# Patient Record
Sex: FEMALE | Race: WHITE | NOT HISPANIC OR LATINO
[De-identification: names, ages, dates, MRNs, and addresses within clinical notes are randomized per-mention and may not be internally consistent; named-entity substitution may affect disease eponyms.]

---

## 2017-02-28 ENCOUNTER — FORM ENCOUNTER (OUTPATIENT)
Age: 57
End: 2017-02-28

## 2017-03-01 ENCOUNTER — OUTPATIENT (OUTPATIENT)
Dept: OUTPATIENT SERVICES | Facility: HOSPITAL | Age: 57
LOS: 1 days | End: 2017-03-01
Payer: COMMERCIAL

## 2017-03-01 ENCOUNTER — APPOINTMENT (OUTPATIENT)
Dept: ORTHOPEDIC SURGERY | Facility: CLINIC | Age: 57
End: 2017-03-01

## 2017-03-01 VITALS — HEIGHT: 63 IN | WEIGHT: 140 LBS | BODY MASS INDEX: 24.8 KG/M2

## 2017-03-01 PROCEDURE — 72020 X-RAY EXAM OF SPINE 1 VIEW: CPT

## 2017-03-01 PROCEDURE — 72020 X-RAY EXAM OF SPINE 1 VIEW: CPT | Mod: 26

## 2017-03-01 PROCEDURE — 73502 X-RAY EXAM HIP UNI 2-3 VIEWS: CPT | Mod: 26,RT

## 2017-03-01 PROCEDURE — 73502 X-RAY EXAM HIP UNI 2-3 VIEWS: CPT

## 2017-04-04 ENCOUNTER — FORM ENCOUNTER (OUTPATIENT)
Age: 57
End: 2017-04-04

## 2017-04-04 DIAGNOSIS — Z87.440 PERSONAL HISTORY OF URINARY (TRACT) INFECTIONS: ICD-10-CM

## 2017-04-04 DIAGNOSIS — Z87.19 PERSONAL HISTORY OF OTHER DISEASES OF THE DIGESTIVE SYSTEM: ICD-10-CM

## 2017-04-04 DIAGNOSIS — Z87.39 PERSONAL HISTORY OF OTHER DISEASES OF THE MUSCULOSKELETAL SYSTEM AND CONNECTIVE TISSUE: ICD-10-CM

## 2017-04-05 ENCOUNTER — OUTPATIENT (OUTPATIENT)
Dept: OUTPATIENT SERVICES | Facility: HOSPITAL | Age: 57
LOS: 1 days | End: 2017-04-05
Payer: COMMERCIAL

## 2017-04-05 DIAGNOSIS — Z22.321 CARRIER OR SUSPECTED CARRIER OF METHICILLIN SUSCEPTIBLE STAPHYLOCOCCUS AUREUS: ICD-10-CM

## 2017-04-05 DIAGNOSIS — M16.11 UNILATERAL PRIMARY OSTEOARTHRITIS, RIGHT HIP: ICD-10-CM

## 2017-04-05 LAB
ANION GAP SERPL CALC-SCNC: 8 MMOL/L — LOW (ref 9–16)
APPEARANCE UR: CLEAR — SIGNIFICANT CHANGE UP
APTT BLD: 32.2 SEC — SIGNIFICANT CHANGE UP (ref 27.5–37.4)
BACTERIA # UR AUTO: PRESENT /HPF
BILIRUB UR-MCNC: NEGATIVE — SIGNIFICANT CHANGE UP
BUN SERPL-MCNC: 13 MG/DL — SIGNIFICANT CHANGE UP (ref 7–23)
CALCIUM SERPL-MCNC: 8.6 MG/DL — SIGNIFICANT CHANGE UP (ref 8.5–10.5)
CHLORIDE SERPL-SCNC: 104 MMOL/L — SIGNIFICANT CHANGE UP (ref 96–108)
CO2 SERPL-SCNC: 27 MMOL/L — SIGNIFICANT CHANGE UP (ref 22–31)
COLOR SPEC: YELLOW — SIGNIFICANT CHANGE UP
CREAT SERPL-MCNC: 0.81 MG/DL — SIGNIFICANT CHANGE UP (ref 0.5–1.3)
DIFF PNL FLD: (no result)
EPI CELLS # UR: SIGNIFICANT CHANGE UP /HPF
GLUCOSE SERPL-MCNC: 95 MG/DL — SIGNIFICANT CHANGE UP (ref 70–99)
GLUCOSE UR QL: NEGATIVE — SIGNIFICANT CHANGE UP
HBA1C BLD-MCNC: 5.4 % — SIGNIFICANT CHANGE UP (ref 4.8–5.6)
HCT VFR BLD CALC: 36.2 % — SIGNIFICANT CHANGE UP (ref 34.5–45)
HGB BLD-MCNC: 12.2 G/DL — SIGNIFICANT CHANGE UP (ref 11.5–15.5)
INR BLD: 1.16 — SIGNIFICANT CHANGE UP (ref 0.88–1.16)
KETONES UR-MCNC: NEGATIVE — SIGNIFICANT CHANGE UP
LEUKOCYTE ESTERASE UR-ACNC: (no result)
MCHC RBC-ENTMCNC: 28.4 PG — SIGNIFICANT CHANGE UP (ref 27–34)
MCHC RBC-ENTMCNC: 33.7 G/DL — SIGNIFICANT CHANGE UP (ref 32–36)
MCV RBC AUTO: 84.4 FL — SIGNIFICANT CHANGE UP (ref 80–100)
NITRITE UR-MCNC: NEGATIVE — SIGNIFICANT CHANGE UP
PH UR: 5.5 — SIGNIFICANT CHANGE UP (ref 4–8)
PLATELET # BLD AUTO: 149 K/UL — LOW (ref 150–400)
POTASSIUM SERPL-MCNC: 4.2 MMOL/L — SIGNIFICANT CHANGE UP (ref 3.5–5.3)
POTASSIUM SERPL-SCNC: 4.2 MMOL/L — SIGNIFICANT CHANGE UP (ref 3.5–5.3)
PROT UR-MCNC: NEGATIVE MG/DL — SIGNIFICANT CHANGE UP
PROTHROM AB SERPL-ACNC: 12.9 SEC — HIGH (ref 9.8–12.7)
RBC # BLD: 4.29 M/UL — SIGNIFICANT CHANGE UP (ref 3.8–5.2)
RBC # FLD: 12.6 % — SIGNIFICANT CHANGE UP (ref 10.3–16.9)
RBC CASTS # UR COMP ASSIST: (no result) /HPF
SODIUM SERPL-SCNC: 139 MMOL/L — SIGNIFICANT CHANGE UP (ref 135–145)
SP GR SPEC: 1.01 — SIGNIFICANT CHANGE UP (ref 1–1.03)
UROBILINOGEN FLD QL: 0.2 E.U./DL — SIGNIFICANT CHANGE UP
WBC # BLD: 4.4 K/UL — SIGNIFICANT CHANGE UP (ref 3.8–10.5)
WBC # FLD AUTO: 4.4 K/UL — SIGNIFICANT CHANGE UP (ref 3.8–10.5)
WBC UR QL: < 5 /HPF — SIGNIFICANT CHANGE UP

## 2017-04-05 PROCEDURE — 71020: CPT | Mod: 26

## 2017-04-05 PROCEDURE — 87086 URINE CULTURE/COLONY COUNT: CPT

## 2017-04-05 PROCEDURE — 93010 ELECTROCARDIOGRAM REPORT: CPT | Mod: NC

## 2017-04-05 PROCEDURE — 72170 X-RAY EXAM OF PELVIS: CPT | Mod: 26

## 2017-04-05 PROCEDURE — 71046 X-RAY EXAM CHEST 2 VIEWS: CPT

## 2017-04-05 PROCEDURE — 87641 MR-STAPH DNA AMP PROBE: CPT

## 2017-04-05 PROCEDURE — 83036 HEMOGLOBIN GLYCOSYLATED A1C: CPT

## 2017-04-05 PROCEDURE — 81003 URINALYSIS AUTO W/O SCOPE: CPT

## 2017-04-05 PROCEDURE — 85610 PROTHROMBIN TIME: CPT

## 2017-04-05 PROCEDURE — 80048 BASIC METABOLIC PNL TOTAL CA: CPT

## 2017-04-05 PROCEDURE — 81001 URINALYSIS AUTO W/SCOPE: CPT

## 2017-04-05 PROCEDURE — 73564 X-RAY EXAM KNEE 4 OR MORE: CPT | Mod: 26,50

## 2017-04-05 PROCEDURE — 87184 SC STD DISK METHOD PER PLATE: CPT

## 2017-04-05 PROCEDURE — 72170 X-RAY EXAM OF PELVIS: CPT

## 2017-04-05 PROCEDURE — 93005 ELECTROCARDIOGRAM TRACING: CPT

## 2017-04-05 PROCEDURE — 85027 COMPLETE CBC AUTOMATED: CPT

## 2017-04-05 PROCEDURE — 73564 X-RAY EXAM KNEE 4 OR MORE: CPT

## 2017-04-05 PROCEDURE — 85730 THROMBOPLASTIN TIME PARTIAL: CPT

## 2017-04-07 LAB
-  AMPICILLIN: SIGNIFICANT CHANGE UP
-  CLINDAMYCIN: SIGNIFICANT CHANGE UP
-  ERYTHROMYCIN: SIGNIFICANT CHANGE UP
-  PENICILLIN: SIGNIFICANT CHANGE UP
-  VANCOMYCIN: SIGNIFICANT CHANGE UP
CULTURE RESULTS: SIGNIFICANT CHANGE UP
METHOD TYPE: SIGNIFICANT CHANGE UP
ORGANISM # SPEC MICROSCOPIC CNT: SIGNIFICANT CHANGE UP
ORGANISM # SPEC MICROSCOPIC CNT: SIGNIFICANT CHANGE UP
SPECIMEN SOURCE: SIGNIFICANT CHANGE UP

## 2017-04-09 LAB
MRSA PCR RESULT.: NEGATIVE — SIGNIFICANT CHANGE UP
S AUREUS DNA NOSE QL NAA+PROBE: NEGATIVE — SIGNIFICANT CHANGE UP

## 2017-04-18 PROBLEM — Z87.39 HISTORY OF OSTEOPOROSIS: Status: RESOLVED | Noted: 2017-03-01 | Resolved: 2017-04-18

## 2017-04-18 PROBLEM — Z87.19 HISTORY OF ESOPHAGEAL REFLUX: Status: RESOLVED | Noted: 2017-03-01 | Resolved: 2017-04-18

## 2017-04-18 PROBLEM — Z87.440 HISTORY OF URINARY TRACT INFECTION: Status: RESOLVED | Noted: 2017-03-01 | Resolved: 2017-04-18

## 2017-04-19 ENCOUNTER — MEDICATION RENEWAL (OUTPATIENT)
Age: 57
End: 2017-04-19

## 2017-04-24 ENCOUNTER — RESULT REVIEW (OUTPATIENT)
Age: 57
End: 2017-04-24

## 2017-04-25 ENCOUNTER — INPATIENT (INPATIENT)
Facility: HOSPITAL | Age: 57
LOS: 3 days | Discharge: HOME HEALTH | DRG: 470 | End: 2017-04-29
Attending: ORTHOPAEDIC SURGERY | Admitting: ORTHOPAEDIC SURGERY
Payer: COMMERCIAL

## 2017-04-25 ENCOUNTER — APPOINTMENT (OUTPATIENT)
Dept: ORTHOPEDIC SURGERY | Facility: HOSPITAL | Age: 57
End: 2017-04-25

## 2017-04-25 VITALS
SYSTOLIC BLOOD PRESSURE: 129 MMHG | HEIGHT: 62 IN | DIASTOLIC BLOOD PRESSURE: 70 MMHG | RESPIRATION RATE: 16 BRPM | HEART RATE: 73 BPM | OXYGEN SATURATION: 99 % | WEIGHT: 130.07 LBS | TEMPERATURE: 97 F

## 2017-04-25 DIAGNOSIS — M16.11 UNILATERAL PRIMARY OSTEOARTHRITIS, RIGHT HIP: ICD-10-CM

## 2017-04-25 LAB
APPEARANCE UR: CLEAR — SIGNIFICANT CHANGE UP
BACTERIA # UR AUTO: PRESENT /HPF
BILIRUB UR-MCNC: NEGATIVE — SIGNIFICANT CHANGE UP
COLOR SPEC: YELLOW — SIGNIFICANT CHANGE UP
DIFF PNL FLD: (no result)
EPI CELLS # UR: SIGNIFICANT CHANGE UP /HPF
GLUCOSE UR QL: NEGATIVE — SIGNIFICANT CHANGE UP
KETONES UR-MCNC: NEGATIVE — SIGNIFICANT CHANGE UP
LEUKOCYTE ESTERASE UR-ACNC: NEGATIVE — SIGNIFICANT CHANGE UP
NITRITE UR-MCNC: NEGATIVE — SIGNIFICANT CHANGE UP
PH UR: 6.5 — SIGNIFICANT CHANGE UP (ref 5–8)
PROT UR-MCNC: NEGATIVE MG/DL — SIGNIFICANT CHANGE UP
RBC CASTS # UR COMP ASSIST: > 10 /HPF
SP GR SPEC: 1.01 — SIGNIFICANT CHANGE UP (ref 1–1.03)
UROBILINOGEN FLD QL: 0.2 E.U./DL — SIGNIFICANT CHANGE UP
WBC UR QL: < 5 /HPF — SIGNIFICANT CHANGE UP

## 2017-04-25 PROCEDURE — 27130 TOTAL HIP ARTHROPLASTY: CPT | Mod: RT

## 2017-04-25 RX ORDER — SENNA PLUS 8.6 MG/1
2 TABLET ORAL AT BEDTIME
Qty: 0 | Refills: 0 | Status: DISCONTINUED | OUTPATIENT
Start: 2017-04-25 | End: 2017-04-29

## 2017-04-25 RX ORDER — CELECOXIB 200 MG/1
200 CAPSULE ORAL
Qty: 0 | Refills: 0 | Status: DISCONTINUED | OUTPATIENT
Start: 2017-04-25 | End: 2017-04-29

## 2017-04-25 RX ORDER — MORPHINE SULFATE 50 MG/1
2 CAPSULE, EXTENDED RELEASE ORAL
Qty: 0 | Refills: 0 | Status: DISCONTINUED | OUTPATIENT
Start: 2017-04-25 | End: 2017-04-25

## 2017-04-25 RX ORDER — ACETAMINOPHEN 500 MG
650 TABLET ORAL EVERY 6 HOURS
Qty: 0 | Refills: 0 | Status: DISCONTINUED | OUTPATIENT
Start: 2017-04-25 | End: 2017-04-29

## 2017-04-25 RX ORDER — MAGNESIUM HYDROXIDE 400 MG/1
30 TABLET, CHEWABLE ORAL DAILY
Qty: 0 | Refills: 0 | Status: DISCONTINUED | OUTPATIENT
Start: 2017-04-25 | End: 2017-04-29

## 2017-04-25 RX ORDER — ACETAMINOPHEN 500 MG
650 TABLET ORAL EVERY 8 HOURS
Qty: 0 | Refills: 0 | Status: DISCONTINUED | OUTPATIENT
Start: 2017-04-25 | End: 2017-04-29

## 2017-04-25 RX ORDER — POLYETHYLENE GLYCOL 3350 17 G/17G
17 POWDER, FOR SOLUTION ORAL DAILY
Qty: 0 | Refills: 0 | Status: DISCONTINUED | OUTPATIENT
Start: 2017-04-25 | End: 2017-04-29

## 2017-04-25 RX ORDER — MORPHINE SULFATE 50 MG/1
2 CAPSULE, EXTENDED RELEASE ORAL
Qty: 0 | Refills: 0 | Status: DISCONTINUED | OUTPATIENT
Start: 2017-04-25 | End: 2017-04-29

## 2017-04-25 RX ORDER — OXYCODONE HYDROCHLORIDE 5 MG/1
20 TABLET ORAL ONCE
Qty: 0 | Refills: 0 | Status: DISCONTINUED | OUTPATIENT
Start: 2017-04-25 | End: 2017-04-25

## 2017-04-25 RX ORDER — ONDANSETRON 8 MG/1
4 TABLET, FILM COATED ORAL EVERY 4 HOURS
Qty: 0 | Refills: 0 | Status: DISCONTINUED | OUTPATIENT
Start: 2017-04-25 | End: 2017-04-29

## 2017-04-25 RX ORDER — CELECOXIB 200 MG/1
200 CAPSULE ORAL ONCE
Qty: 0 | Refills: 0 | Status: DISCONTINUED | OUTPATIENT
Start: 2017-04-25 | End: 2017-04-25

## 2017-04-25 RX ORDER — KETOROLAC TROMETHAMINE 30 MG/ML
15 SYRINGE (ML) INJECTION EVERY 4 HOURS
Qty: 0 | Refills: 0 | Status: DISCONTINUED | OUTPATIENT
Start: 2017-04-25 | End: 2017-04-25

## 2017-04-25 RX ORDER — ASPIRIN/CALCIUM CARB/MAGNESIUM 324 MG
325 TABLET ORAL
Qty: 0 | Refills: 0 | Status: DISCONTINUED | OUTPATIENT
Start: 2017-04-25 | End: 2017-04-29

## 2017-04-25 RX ORDER — KETOROLAC TROMETHAMINE 0.5 %
1 DROPS OPHTHALMIC (EYE) ONCE
Qty: 0 | Refills: 0 | Status: COMPLETED | OUTPATIENT
Start: 2017-04-25 | End: 2017-04-25

## 2017-04-25 RX ORDER — BUPIVACAINE 13.3 MG/ML
20 INJECTION, SUSPENSION, LIPOSOMAL INFILTRATION ONCE
Qty: 0 | Refills: 0 | Status: DISCONTINUED | OUTPATIENT
Start: 2017-04-25 | End: 2017-04-25

## 2017-04-25 RX ORDER — DOCUSATE SODIUM 100 MG
100 CAPSULE ORAL THREE TIMES A DAY
Qty: 0 | Refills: 0 | Status: DISCONTINUED | OUTPATIENT
Start: 2017-04-25 | End: 2017-04-29

## 2017-04-25 RX ORDER — SODIUM CHLORIDE 9 MG/ML
1000 INJECTION, SOLUTION INTRAVENOUS
Qty: 0 | Refills: 0 | Status: DISCONTINUED | OUTPATIENT
Start: 2017-04-25 | End: 2017-04-29

## 2017-04-25 RX ORDER — CEFAZOLIN SODIUM 1 G
2000 VIAL (EA) INJECTION EVERY 8 HOURS
Qty: 0 | Refills: 0 | Status: COMPLETED | OUTPATIENT
Start: 2017-04-25 | End: 2017-04-26

## 2017-04-25 RX ORDER — ONDANSETRON 8 MG/1
4 TABLET, FILM COATED ORAL EVERY 6 HOURS
Qty: 0 | Refills: 0 | Status: DISCONTINUED | OUTPATIENT
Start: 2017-04-25 | End: 2017-04-29

## 2017-04-25 RX ORDER — OXYCODONE HYDROCHLORIDE 5 MG/1
10 TABLET ORAL EVERY 4 HOURS
Qty: 0 | Refills: 0 | Status: DISCONTINUED | OUTPATIENT
Start: 2017-04-25 | End: 2017-04-29

## 2017-04-25 RX ORDER — OXYCODONE HYDROCHLORIDE 5 MG/1
5 TABLET ORAL EVERY 4 HOURS
Qty: 0 | Refills: 0 | Status: DISCONTINUED | OUTPATIENT
Start: 2017-04-25 | End: 2017-04-29

## 2017-04-25 RX ADMIN — SODIUM CHLORIDE 100 MILLILITER(S): 9 INJECTION, SOLUTION INTRAVENOUS at 19:30

## 2017-04-25 RX ADMIN — Medication 650 MILLIGRAM(S): at 23:08

## 2017-04-25 RX ADMIN — Medication 100 MILLIGRAM(S): at 22:08

## 2017-04-25 RX ADMIN — Medication 15 MILLIGRAM(S): at 18:48

## 2017-04-25 RX ADMIN — MORPHINE SULFATE 2 MILLIGRAM(S): 50 CAPSULE, EXTENDED RELEASE ORAL at 18:48

## 2017-04-25 RX ADMIN — Medication 15 MILLIGRAM(S): at 19:00

## 2017-04-25 RX ADMIN — Medication 1 DROP(S): at 16:25

## 2017-04-25 RX ADMIN — Medication 650 MILLIGRAM(S): at 22:08

## 2017-04-25 RX ADMIN — MORPHINE SULFATE 2 MILLIGRAM(S): 50 CAPSULE, EXTENDED RELEASE ORAL at 19:00

## 2017-04-25 NOTE — H&P ADULT - PROBLEM SELECTOR PLAN 1
Admit to Orthopaedic Service.  Presents today for elective right total hip replacement  Pt medically stable and cleared for procedure today by Dr. Aleman

## 2017-04-25 NOTE — PROGRESS NOTE ADULT - SUBJECTIVE AND OBJECTIVE BOX
Ortho Post Op Check    Procedure: BIJAN  Surgeon: Hansel Stratton  Laterality: R    Pt comfortable without complaints, pain controlled  Denies CP, SOB, N/V, numbness/tingling     Vital Signs Last 24 Hrs  T(C): 36.2, Max: 36.2 (04-25 @ 20:10)  T(F): 97.1, Max: 97.1 (04-25 @ 20:10)  HR: 78 (74 - 89)  BP: 125/84 (120/77 - 146/81)  BP(mean): --  RR: 16 (16 - 16)  SpO2: 99% (99% - 100%)  Wt(kg): --  AVSS    General: Pt Alert and oriented, NAD  DSG C/D/I  Pulses:  Sensation: SILT   Motor: EHL/FHL/TA/GS  AIN/ PIN/ U / R / A            Post-op X-Ray: Implant in adequate position    A/P: 57yFemale POD#0 s/p   - Stable  - Pain Control  - DVT ppx:  mg BID  - Post op abx: Cefazolin  - PT, WBS: WBAT    Ortho Pager [phone]

## 2017-04-25 NOTE — H&P ADULT - NSHPLABSRESULTS_GEN_ALL_CORE
Preop CBC, BMP, PT/PTT/INR - WNL per medical clearance   UA/UCx+ treated with Augmentin - repeated DOS    Preop EKG - NSR - WNL per medical clearance   Preop CXR - no focal infiltrates - WNL per medical clearance

## 2017-04-25 NOTE — H&P ADULT - HISTORY OF PRESENT ILLNESS
57F with right hip pain x 4 years that began spontaneously and without injury or trauma. Pt's pain has progressively worsened without improvement and has become increasingly unresponsive to conservative management. Pt ambulates without the use of an assistive device at baseline. Denies numbness/tingling/weakness of extremities. Denies h/o DVT. Denies fever, chills, CP, SOB, N/V today.   +UA/UCx pre-op clearance exam, Augmentincourse completed per patient    Presents today for elective right total hip replacement

## 2017-04-26 LAB
ANION GAP SERPL CALC-SCNC: 7 MMOL/L — LOW (ref 9–16)
BUN SERPL-MCNC: 10 MG/DL — SIGNIFICANT CHANGE UP (ref 7–23)
CALCIUM SERPL-MCNC: 8.7 MG/DL — SIGNIFICANT CHANGE UP (ref 8.5–10.5)
CHLORIDE SERPL-SCNC: 104 MMOL/L — SIGNIFICANT CHANGE UP (ref 96–108)
CO2 SERPL-SCNC: 28 MMOL/L — SIGNIFICANT CHANGE UP (ref 22–31)
CREAT SERPL-MCNC: 0.58 MG/DL — SIGNIFICANT CHANGE UP (ref 0.5–1.3)
CULTURE RESULTS: SIGNIFICANT CHANGE UP
GLUCOSE SERPL-MCNC: 106 MG/DL — HIGH (ref 70–99)
HCT VFR BLD CALC: 31.6 % — LOW (ref 34.5–45)
HGB BLD-MCNC: 10.9 G/DL — LOW (ref 11.5–15.5)
MCHC RBC-ENTMCNC: 29.2 PG — SIGNIFICANT CHANGE UP (ref 27–34)
MCHC RBC-ENTMCNC: 34.5 G/DL — SIGNIFICANT CHANGE UP (ref 32–36)
MCV RBC AUTO: 84.7 FL — SIGNIFICANT CHANGE UP (ref 80–100)
PLATELET # BLD AUTO: 160 K/UL — SIGNIFICANT CHANGE UP (ref 150–400)
POTASSIUM SERPL-MCNC: 4.1 MMOL/L — SIGNIFICANT CHANGE UP (ref 3.5–5.3)
POTASSIUM SERPL-SCNC: 4.1 MMOL/L — SIGNIFICANT CHANGE UP (ref 3.5–5.3)
RBC # BLD: 3.73 M/UL — LOW (ref 3.8–5.2)
RBC # FLD: 12.2 % — SIGNIFICANT CHANGE UP (ref 10.3–16.9)
SODIUM SERPL-SCNC: 139 MMOL/L — SIGNIFICANT CHANGE UP (ref 135–145)
SPECIMEN SOURCE: SIGNIFICANT CHANGE UP
WBC # BLD: 11.7 K/UL — HIGH (ref 3.8–10.5)
WBC # FLD AUTO: 11.7 K/UL — HIGH (ref 3.8–10.5)

## 2017-04-26 PROCEDURE — 72170 X-RAY EXAM OF PELVIS: CPT | Mod: 26

## 2017-04-26 RX ORDER — DOCUSATE SODIUM 100 MG
1 CAPSULE ORAL
Qty: 0 | Refills: 0 | COMMUNITY
Start: 2017-04-26

## 2017-04-26 RX ORDER — ASPIRIN/CALCIUM CARB/MAGNESIUM 324 MG
1 TABLET ORAL
Qty: 0 | Refills: 0 | COMMUNITY
Start: 2017-04-26

## 2017-04-26 RX ORDER — LACTOBACILLUS ACIDOPHILUS 100MM CELL
0 CAPSULE ORAL
Qty: 0 | Refills: 0 | COMMUNITY
Start: 2017-04-26

## 2017-04-26 RX ORDER — MAGNESIUM HYDROXIDE 400 MG/1
30 TABLET, CHEWABLE ORAL
Qty: 0 | Refills: 0 | COMMUNITY
Start: 2017-04-26

## 2017-04-26 RX ORDER — LACTOBACILLUS ACIDOPHILUS 100MM CELL
1 CAPSULE ORAL DAILY
Qty: 0 | Refills: 0 | Status: DISCONTINUED | OUTPATIENT
Start: 2017-04-26 | End: 2017-04-29

## 2017-04-26 RX ADMIN — Medication 650 MILLIGRAM(S): at 13:50

## 2017-04-26 RX ADMIN — Medication 100 MILLIGRAM(S): at 06:04

## 2017-04-26 RX ADMIN — Medication 1 TABLET(S): at 06:52

## 2017-04-26 RX ADMIN — SENNA PLUS 2 TABLET(S): 8.6 TABLET ORAL at 21:20

## 2017-04-26 RX ADMIN — OXYCODONE HYDROCHLORIDE 5 MILLIGRAM(S): 5 TABLET ORAL at 16:13

## 2017-04-26 RX ADMIN — Medication 650 MILLIGRAM(S): at 07:00

## 2017-04-26 RX ADMIN — Medication 325 MILLIGRAM(S): at 06:04

## 2017-04-26 RX ADMIN — Medication 100 MILLIGRAM(S): at 13:50

## 2017-04-26 RX ADMIN — POLYETHYLENE GLYCOL 3350 17 GRAM(S): 17 POWDER, FOR SOLUTION ORAL at 13:50

## 2017-04-26 RX ADMIN — Medication 100 MILLIGRAM(S): at 21:20

## 2017-04-26 RX ADMIN — Medication 650 MILLIGRAM(S): at 14:50

## 2017-04-26 RX ADMIN — Medication 650 MILLIGRAM(S): at 22:21

## 2017-04-26 RX ADMIN — Medication 650 MILLIGRAM(S): at 21:21

## 2017-04-26 RX ADMIN — Medication 325 MILLIGRAM(S): at 16:13

## 2017-04-26 RX ADMIN — Medication 650 MILLIGRAM(S): at 06:03

## 2017-04-26 NOTE — DISCHARGE NOTE ADULT - HOME CARE AGENCY
[facility] [phone]  RN eval and home Physical therapy at her son's address  [address] [address] [place] [address]  [phone]  Son Garett Goss tel [phone]

## 2017-04-26 NOTE — DISCHARGE NOTE ADULT - CARE PROVIDERS DIRECT ADDRESSES
[email or web address]

## 2017-04-26 NOTE — PHYSICAL THERAPY INITIAL EVALUATION ADULT - CRITERIA FOR SKILLED THERAPEUTIC INTERVENTIONS
functional limitations in following categories/anticipated discharge recommendation/rehab potential/anticipated equipment needs at discharge/therapy frequency/impairments found/predicted duration of therapy intervention

## 2017-04-26 NOTE — PROGRESS NOTE ADULT - SUBJECTIVE AND OBJECTIVE BOX
SUBJECTIVE: Patient seen and examined. Pt doing well o/n.  No f/c/n/v/cp/sob.     OBJECTIVE:  Vital Signs Last 24 Hrs  T(C): 36.4, Max: 36.7 (- @ 15:45)  T(F): 97.5, Max: 98.1 (- @ 15:45)  HR: 70 (66 - 91)  BP: 119/76 (99/61 - 146/81)  BP(mean): --  RR: 16 (14 - 16)  SpO2: 96% (96% - 100%)    Affected extremity:          Dressing: clean/dry/intact            Sensation: SILT         Motor exam: 5/5 TA/GS/EHL         warm well perfused; capillary refill <3 seconds     LABS:            Urinalysis Basic - ( 2017 11:22 )    Color: Yellow / Appearance: Clear / S.015 / pH: x  Gluc: x / Ketone: NEGATIVE  / Bili: NEGATIVE / Urobili: 0.2 E.U./dL   Blood: x / Protein: NEGATIVE mg/dL / Nitrite: NEGATIVE   Leuk Esterase: NEGATIVE / RBC: > 10 /HPF / WBC < 5 /HPF   Sq Epi: x / Non Sq Epi: Few /HPF / Bacteria: Present /HPF      MEDICATIONS:lactated ringers. 1000milliLiter(s) IV Continuous <Continuous>  aspirin enteric coated 325milliGRAM(s) Oral two times a day  acetaminophen   Tablet 650milliGRAM(s) Oral every 6 hours PRN  acetaminophen   Tablet. 650milliGRAM(s) Oral every 8 hours  celecoxib 200milliGRAM(s) Oral two times a day after meals  oxyCODONE IR 5milliGRAM(s) Oral every 4 hours PRN  oxyCODONE IR 10milliGRAM(s) Oral every 4 hours PRN  morphine  - Injectable 2milliGRAM(s) IV Push every 3 hours PRN  ceFAZolin   IVPB 2000milliGRAM(s) IV Intermittent every 8 hours  aluminum hydroxide/magnesium hydroxide/simethicone Suspension 30milliLiter(s) Oral four times a day PRN  ondansetron Injectable 4milliGRAM(s) IV Push every 6 hours PRN  polyethylene glycol 3350 17Gram(s) Oral daily  senna 2Tablet(s) Oral at bedtime PRN  magnesium hydroxide Suspension 30milliLiter(s) Oral daily PRN  docusate sodium 100milliGRAM(s) Oral three times a day  ondansetron Injectable 4milliGRAM(s) IV Push every 4 hours PRN    Anticoagulation:  aspirin enteric coated 325milliGRAM(s) Oral two times a day    Antibiotics:   ceFAZolin   IVPB 2000milliGRAM(s) IV Intermittent every 8 hours    Pain medications:   acetaminophen   Tablet 650milliGRAM(s) Oral every 6 hours PRN  acetaminophen   Tablet. 650milliGRAM(s) Oral every 8 hours  celecoxib 200milliGRAM(s) Oral two times a day after meals  oxyCODONE IR 5milliGRAM(s) Oral every 4 hours PRN  oxyCODONE IR 10milliGRAM(s) Oral every 4 hours PRN  morphine  - Injectable 2milliGRAM(s) IV Push every 3 hours PRN  ondansetron Injectable 4milliGRAM(s) IV Push every 6 hours PRN  ondansetron Injectable 4milliGRAM(s) IV Push every 4 hours PRN    A/P :  Pt is a 56yo Female s/p R BIJAN Ant 17  POD # 1  -    Pain control  -    DVT ppx: ASA     -    Weight bearing status: WBAT   -    Physical Therapy  -    Dispo: Home

## 2017-04-26 NOTE — OCCUPATIONAL THERAPY INITIAL EVALUATION ADULT - LIVES WITH, PROFILE
spouse/in private home +steps, will be staying at son's home upon discharge-son will not be home during the day +steps

## 2017-04-26 NOTE — PHYSICAL THERAPY INITIAL EVALUATION ADULT - ADDITIONAL COMMENTS
Patient lives alone in a 2 story home, with stairs to enter the house. Will stay with son and daughter-in-law after discharge, however, similar home setup. Patient lives alone in a 2 story home, with stairs to enter the house. Will stay with son and daughter-in-law after discharge, however, similar home setup and will not have help at home during the day.

## 2017-04-26 NOTE — OCCUPATIONAL THERAPY INITIAL EVALUATION ADULT - MD ORDER
57F with right hip pain x 4 years that began spontaneously and without injury or trauma. Pt's pain has progressively worsened without improvement and has become increasingly unresponsive to conservative management. Pt ambulates without the use of an assistive device at baseline. Denies numbness/tingling/weakness of extremities. Denies h/o DVT. Denies fever, chills, CP, SOB, N/V today.

## 2017-04-26 NOTE — DISCHARGE NOTE ADULT - CARE PROVIDER_API CALL
Luis Stratton), Orthopaedic Surgery  [address] [place] [address]  Phone: [phone]  Fax: [phone]

## 2017-04-26 NOTE — DISCHARGE NOTE ADULT - HOSPITAL COURSE
Admit 4/25/17  Surgery S/P Right BIJAN (anterior)  Pre/post-op Antibiotics  DVT prophylaxis  Physical Therapy  Pain Management

## 2017-04-26 NOTE — PHYSICAL THERAPY INITIAL EVALUATION ADULT - PERTINENT HX OF CURRENT PROBLEM, REHAB EVAL
From H&P: "57F with right hip pain x 4 years that began spontaneously and without injury or trauma. Pt's pain has progressively worsened without improvement and has become increasingly unresponsive to conservative management. Pt ambulates without the use of an assistive device at baseline." S/p R BIJAN POD #1.

## 2017-04-26 NOTE — DISCHARGE NOTE ADULT - MEDICATION SUMMARY - MEDICATIONS TO TAKE
I will START or STAY ON the medications listed below when I get home from the hospital:    aspirin 325 mg oral delayed release tablet  -- 1 tab(s) by mouth 2 times a day. Please take for 4 weeks after surgery.  -- Indication: For Prevent blood clot    magnesium hydroxide 8% oral suspension  -- 30 milliliter(s) by mouth once a day, As needed, Constipation  -- Indication: For Prevent constipation    docusate sodium 100 mg oral capsule  -- 1 cap(s) by mouth 3 times a day  -- Indication: For stool softener    lactobacillus acidophilus oral capsule  --  by mouth   -- Indication: For GI PPX

## 2017-04-26 NOTE — PROGRESS NOTE ADULT - SUBJECTIVE AND OBJECTIVE BOX
ORTHO NOTE    [x ] Pt seen/examined. 8am  [ ] Pt without any complaints/in NAD.    [x ] Pt complains of: Incisional pain with movements.     [x ] ROS  otherwise negative    .    PHYSICAL EXAM:    Vital Signs Last 24 Hrs  T(C): 36.1, Max: 36.7 (04-25 @ 15:45)  T(F): 97, Max: 98.1 (04-25 @ 15:45)  HR: 78 (66 - 91)  BP: 130/62 (99/61 - 146/81)  BP(mean): --  RR: 16 (14 - 16)  SpO2: 97% (96% - 100%)    I&O's Detail  I & Os for 24h ending 26 Apr 2017 07:00  =============================================  IN:    lactated ringers.: 1100 ml    IV PiggyBack: 100 ml    Total IN: 1200 ml  ---------------------------------------------  OUT:    Intermittent Catheterization - Urethral: 800 ml    Voided: 800 ml    Total OUT: 1600 ml  ---------------------------------------------  Total NET: -400 ml    I & Os for current day (as of 26 Apr 2017 10:39)  =============================================  IN:    Oral Fluid: 420 ml    Total IN: 420 ml  ---------------------------------------------  OUT:    Voided: 800 ml    Total OUT: 800 ml  ---------------------------------------------  Total NET: -380 ml       CAPILLARY BLOOD GLUCOSE                  Neuro: A&0x3    Lungs: Denies SOB    CV: Denies chest pain    ABD: NON distended positive bowel sounds      Ext: NVID Dressing clean dry and intact.      LABS                        10.9   11.7  )-----------( 160      ( 26 Apr 2017 08:12 )             31.6                                04-26    139  |  104  |  10  ----------------------------<  106<H>  4.1   |  28  |  0.58    Ca    8.7      26 Apr 2017 08:12       ASSESSMENT/PLAN:      STATUS POST:  Right BIJAN (anterior)    CONTINUE:          [x ] PT WBAT    [x ] DVT PPX- ASA    [x ] Pain Mgt MEDICATIONS  (PRN):  oxyCODONE IR 5milliGRAM(s) Oral every 4 hours PRN Mild Pain  oxyCODONE IR 10milliGRAM(s) Oral every 4 hours PRN Moderate Pain  morphine  - Injectable 2milliGRAM(s) IV Push every 3 hours PRN Severe Pain      [x ] Dispo plan- Home

## 2017-04-26 NOTE — DISCHARGE NOTE ADULT - PATIENT PORTAL LINK FT
“You can access the [employer] Patient Portal, offered by [facility], by registering with the following website: [email or web address]”

## 2017-04-26 NOTE — DISCHARGE NOTE ADULT - ADDITIONAL INSTRUCTIONS
No strenuous activity, heavy lifting, driving, tub bathing, or returning to work until cleared by MD.  You may shower--dressing is waterproof.  Remove dressing after post op day 7, then leave incision open to air.  Follow up with Dr. Stratton call  to schedule an appt within 10-14 days.  If you don't have a bowel movement by post op day 3, then take Milk of Magnesia (over the counter).  If no bowel movement by at least post op day 5, then use a Dulcolax suppository (over the counter) and/or a Fleets enema--if still no bowel movement, call your MD.  Contact your doctor if you experience: fever greater than 101.5, chills, chest pain, difficulty breathing, bleeding, redness or heat around the incision.  Please follow up with your primary care provider.  Please take pain meds as prescribed by Dr Stratton.

## 2017-04-26 NOTE — OCCUPATIONAL THERAPY INITIAL EVALUATION ADULT - GENERAL OBSERVATIONS, REHAB EVAL
Right hand dominant. Chart reviewed, patient cleared for OT eval by JI Boss. Received in supine, NAD, +heplock, +SCDs.

## 2017-04-26 NOTE — PHYSICAL THERAPY INITIAL EVALUATION ADULT - ACTIVE RANGE OF MOTION EXAMINATION, REHAB EVAL
bilateral upper extremity Active ROM was WFL (within functional limits)/Left LE Active ROM was WFL (within functional limits)/R hip AROM limited by pain/deficits as listed below

## 2017-04-27 LAB
ANION GAP SERPL CALC-SCNC: 8 MMOL/L — LOW (ref 9–16)
BUN SERPL-MCNC: 12 MG/DL — SIGNIFICANT CHANGE UP (ref 7–23)
CALCIUM SERPL-MCNC: 8.1 MG/DL — LOW (ref 8.5–10.5)
CHLORIDE SERPL-SCNC: 104 MMOL/L — SIGNIFICANT CHANGE UP (ref 96–108)
CO2 SERPL-SCNC: 25 MMOL/L — SIGNIFICANT CHANGE UP (ref 22–31)
CREAT SERPL-MCNC: 0.61 MG/DL — SIGNIFICANT CHANGE UP (ref 0.5–1.3)
GLUCOSE SERPL-MCNC: 101 MG/DL — HIGH (ref 70–99)
HCT VFR BLD CALC: 27.9 % — LOW (ref 34.5–45)
HGB BLD-MCNC: 9.4 G/DL — LOW (ref 11.5–15.5)
MCHC RBC-ENTMCNC: 28.5 PG — SIGNIFICANT CHANGE UP (ref 27–34)
MCHC RBC-ENTMCNC: 33.7 G/DL — SIGNIFICANT CHANGE UP (ref 32–36)
MCV RBC AUTO: 84.5 FL — SIGNIFICANT CHANGE UP (ref 80–100)
PLATELET # BLD AUTO: 140 K/UL — LOW (ref 150–400)
POTASSIUM SERPL-MCNC: 3.6 MMOL/L — SIGNIFICANT CHANGE UP (ref 3.5–5.3)
POTASSIUM SERPL-SCNC: 3.6 MMOL/L — SIGNIFICANT CHANGE UP (ref 3.5–5.3)
RBC # BLD: 3.3 M/UL — LOW (ref 3.8–5.2)
RBC # FLD: 13.1 % — SIGNIFICANT CHANGE UP (ref 10.3–16.9)
SODIUM SERPL-SCNC: 137 MMOL/L — SIGNIFICANT CHANGE UP (ref 135–145)
SURGICAL PATHOLOGY STUDY: SIGNIFICANT CHANGE UP
WBC # BLD: 7.8 K/UL — SIGNIFICANT CHANGE UP (ref 3.8–10.5)
WBC # FLD AUTO: 7.8 K/UL — SIGNIFICANT CHANGE UP (ref 3.8–10.5)

## 2017-04-27 RX ADMIN — Medication 650 MILLIGRAM(S): at 06:16

## 2017-04-27 RX ADMIN — Medication 100 MILLIGRAM(S): at 13:31

## 2017-04-27 RX ADMIN — CELECOXIB 200 MILLIGRAM(S): 200 CAPSULE ORAL at 13:36

## 2017-04-27 RX ADMIN — Medication 650 MILLIGRAM(S): at 14:30

## 2017-04-27 RX ADMIN — OXYCODONE HYDROCHLORIDE 5 MILLIGRAM(S): 5 TABLET ORAL at 11:30

## 2017-04-27 RX ADMIN — Medication 325 MILLIGRAM(S): at 18:14

## 2017-04-27 RX ADMIN — Medication 1 TABLET(S): at 13:32

## 2017-04-27 RX ADMIN — Medication 325 MILLIGRAM(S): at 05:15

## 2017-04-27 RX ADMIN — OXYCODONE HYDROCHLORIDE 5 MILLIGRAM(S): 5 TABLET ORAL at 10:51

## 2017-04-27 RX ADMIN — OXYCODONE HYDROCHLORIDE 5 MILLIGRAM(S): 5 TABLET ORAL at 19:30

## 2017-04-27 RX ADMIN — OXYCODONE HYDROCHLORIDE 5 MILLIGRAM(S): 5 TABLET ORAL at 18:48

## 2017-04-27 RX ADMIN — POLYETHYLENE GLYCOL 3350 17 GRAM(S): 17 POWDER, FOR SOLUTION ORAL at 13:32

## 2017-04-27 RX ADMIN — Medication 10 MILLIGRAM(S): at 10:52

## 2017-04-27 RX ADMIN — Medication 650 MILLIGRAM(S): at 05:16

## 2017-04-27 RX ADMIN — Medication 100 MILLIGRAM(S): at 05:15

## 2017-04-27 RX ADMIN — Medication 650 MILLIGRAM(S): at 13:32

## 2017-04-27 RX ADMIN — CELECOXIB 200 MILLIGRAM(S): 200 CAPSULE ORAL at 14:04

## 2017-04-27 NOTE — PROGRESS NOTE ADULT - SUBJECTIVE AND OBJECTIVE BOX
SUBJECTIVE: Patient seen and examined. Pt doing well o/n.  No f/c/n/v/cp/sob.   Patient is unhappy about not being able to sleep and her room mate. I tried to explain the shortage of beds situation to the patient.    OBJECTIVE:  Vital Signs Last 24 Hrs  T(C): 36.7, Max: 37.3 ( @ 17:30)  T(F): 98, Max: 99.1 ( @ 17:30)  HR: 84 (78 - 91)  BP: 138/85 (102/58 - 138/85)  BP(mean): --  RR: 17 (15 - 17)  SpO2: 99% (96% - 99%)    Affected extremity:          Dressing: clean/dry/intact            Sensation: SILT         Motor exam: 5/5 TA/GS/EHL         warm well perfused; capillary refill <3 seconds     LABS:                        10.9   11.7  )-----------( 160      ( 2017 08:12 )             31.6         139  |  104  |  10  ----------------------------<  106<H>  4.1   |  28  |  0.58    Ca    8.7      2017 08:12        Urinalysis Basic - ( 2017 11:22 )    Color: Yellow / Appearance: Clear / S.015 / pH: x  Gluc: x / Ketone: NEGATIVE  / Bili: NEGATIVE / Urobili: 0.2 E.U./dL   Blood: x / Protein: NEGATIVE mg/dL / Nitrite: NEGATIVE   Leuk Esterase: NEGATIVE / RBC: > 10 /HPF / WBC < 5 /HPF   Sq Epi: x / Non Sq Epi: Few /HPF / Bacteria: Present /HPF      MEDICATIONS:lactated ringers. 1000milliLiter(s) IV Continuous <Continuous>  aspirin enteric coated 325milliGRAM(s) Oral two times a day  acetaminophen   Tablet 650milliGRAM(s) Oral every 6 hours PRN  acetaminophen   Tablet. 650milliGRAM(s) Oral every 8 hours  celecoxib 200milliGRAM(s) Oral two times a day after meals  oxyCODONE IR 5milliGRAM(s) Oral every 4 hours PRN  oxyCODONE IR 10milliGRAM(s) Oral every 4 hours PRN  morphine  - Injectable 2milliGRAM(s) IV Push every 3 hours PRN  aluminum hydroxide/magnesium hydroxide/simethicone Suspension 30milliLiter(s) Oral four times a day PRN  ondansetron Injectable 4milliGRAM(s) IV Push every 6 hours PRN  polyethylene glycol 3350 17Gram(s) Oral daily  bisacodyl Suppository 10milliGRAM(s) Rectal daily PRN  senna 2Tablet(s) Oral at bedtime PRN  magnesium hydroxide Suspension 30milliLiter(s) Oral daily PRN  docusate sodium 100milliGRAM(s) Oral three times a day  ondansetron Injectable 4milliGRAM(s) IV Push every 4 hours PRN  lactobacillus acidophilus 1Tablet(s) Oral daily    Anticoagulation:  aspirin enteric coated 325milliGRAM(s) Oral two times a day    Antibiotics:     Pain medications:   acetaminophen   Tablet 650milliGRAM(s) Oral every 6 hours PRN  acetaminophen   Tablet. 650milliGRAM(s) Oral every 8 hours  celecoxib 200milliGRAM(s) Oral two times a day after meals  oxyCODONE IR 5milliGRAM(s) Oral every 4 hours PRN  oxyCODONE IR 10milliGRAM(s) Oral every 4 hours PRN  morphine  - Injectable 2milliGRAM(s) IV Push every 3 hours PRN  ondansetron Injectable 4milliGRAM(s) IV Push every 6 hours PRN  ondansetron Injectable 4milliGRAM(s) IV Push every 4 hours PRN    A/P :  Pt is a 58yo Female s/p R BIJAN Ant Vega 17 POD # 2  -    Pain control  -    DVT ppx: ASA     -    Weight bearing status: WBAT   -    Physical Therapy  -    Dispo: Home

## 2017-04-27 NOTE — PROGRESS NOTE ADULT - SUBJECTIVE AND OBJECTIVE BOX
POST OPERATIVE DAY #: 2  STATUS POST: Right THR                        SUBJECTIVE: Patient seen and examined.     Pain:  well controlled     OBJECTIVE:     Vital Signs Last 24 Hrs  T(C): 36.6, Max: 37.3 ( @ 17:30)  T(F): 97.8, Max: 99.1 ( @ 17:30)  HR: 82 (82 - 91)  BP: 118/79 (102/58 - 138/85)  BP(mean): --  RR: 15 (15 - 17)  SpO2: 96% (96% - 99%)    Affected extremity:          Dressing: clean/dry/intact          Sensation: intact to light touch          Motor exam:  5 / 5 EHL         warm, well-perfused; capillary refill < 3 seconds              I&O's Detail  I & Os for 24h ending 2017 07:00  =============================================  IN:    Oral Fluid: 660 ml    Total IN: 660 ml  ---------------------------------------------  OUT:    Voided: 1400 ml    Total OUT: 1400 ml  ---------------------------------------------  Total NET: -740 ml    I & Os for current day (as of 2017 11:13)  =============================================  IN:    Oral Fluid: 360 ml    Total IN: 360 ml  ---------------------------------------------  OUT:    Voided: 200 ml    Total OUT: 200 ml  ---------------------------------------------  Total NET: 160 ml      LABS:                        9.4    7.8   )-----------( 140      ( 2017 07:30 )             27.9     04-    137  |  104  |  12  ----------------------------<  101<H>  3.6   |  25  |  0.61    Ca    8.1<L>      2017 07:30        Urinalysis Basic - ( 2017 11:22 )    Color: Yellow / Appearance: Clear / S.015 / pH: x  Gluc: x / Ketone: NEGATIVE  / Bili: NEGATIVE / Urobili: 0.2 E.U./dL   Blood: x / Protein: NEGATIVE mg/dL / Nitrite: NEGATIVE   Leuk Esterase: NEGATIVE / RBC: > 10 /HPF / WBC < 5 /HPF   Sq Epi: x / Non Sq Epi: Few /HPF / Bacteria: Present /HPF        MEDICATIONS:    acetaminophen   Tablet 650milliGRAM(s) Oral every 6 hours PRN  acetaminophen   Tablet. 650milliGRAM(s) Oral every 8 hours  celecoxib 200milliGRAM(s) Oral two times a day after meals  oxyCODONE IR 5milliGRAM(s) Oral every 4 hours PRN  oxyCODONE IR 10milliGRAM(s) Oral every 4 hours PRN  morphine  - Injectable 2milliGRAM(s) IV Push every 3 hours PRN  ondansetron Injectable 4milliGRAM(s) IV Push every 6 hours PRN  ondansetron Injectable 4milliGRAM(s) IV Push every 4 hours PRN    aspirin enteric coated 325milliGRAM(s) Oral two times a day      RADIOLOGY & ADDITIONAL STUDIES:    ASSESSMENT AND PLAN:     1. Pain control  2. DVT ppx: ASA BID  3. PT  4. Dispo: Home when cleared by PT

## 2017-04-28 LAB
ANION GAP SERPL CALC-SCNC: 8 MMOL/L — LOW (ref 9–16)
BUN SERPL-MCNC: 8 MG/DL — SIGNIFICANT CHANGE UP (ref 7–23)
CALCIUM SERPL-MCNC: 8.9 MG/DL — SIGNIFICANT CHANGE UP (ref 8.5–10.5)
CHLORIDE SERPL-SCNC: 105 MMOL/L — SIGNIFICANT CHANGE UP (ref 96–108)
CO2 SERPL-SCNC: 27 MMOL/L — SIGNIFICANT CHANGE UP (ref 22–31)
CREAT SERPL-MCNC: 0.6 MG/DL — SIGNIFICANT CHANGE UP (ref 0.5–1.3)
GLUCOSE SERPL-MCNC: 105 MG/DL — HIGH (ref 70–99)
HCT VFR BLD CALC: 26.9 % — LOW (ref 34.5–45)
HGB BLD-MCNC: 9.1 G/DL — LOW (ref 11.5–15.5)
MCHC RBC-ENTMCNC: 28.9 PG — SIGNIFICANT CHANGE UP (ref 27–34)
MCHC RBC-ENTMCNC: 33.8 G/DL — SIGNIFICANT CHANGE UP (ref 32–36)
MCV RBC AUTO: 85.4 FL — SIGNIFICANT CHANGE UP (ref 80–100)
PLATELET # BLD AUTO: 135 K/UL — LOW (ref 150–400)
POTASSIUM SERPL-MCNC: 4.2 MMOL/L — SIGNIFICANT CHANGE UP (ref 3.5–5.3)
POTASSIUM SERPL-SCNC: 4.2 MMOL/L — SIGNIFICANT CHANGE UP (ref 3.5–5.3)
RBC # BLD: 3.15 M/UL — LOW (ref 3.8–5.2)
RBC # FLD: 13.1 % — SIGNIFICANT CHANGE UP (ref 10.3–16.9)
SODIUM SERPL-SCNC: 140 MMOL/L — SIGNIFICANT CHANGE UP (ref 135–145)
WBC # BLD: 6.3 K/UL — SIGNIFICANT CHANGE UP (ref 3.8–10.5)
WBC # FLD AUTO: 6.3 K/UL — SIGNIFICANT CHANGE UP (ref 3.8–10.5)

## 2017-04-28 RX ADMIN — Medication 650 MILLIGRAM(S): at 22:07

## 2017-04-28 RX ADMIN — Medication 650 MILLIGRAM(S): at 14:00

## 2017-04-28 RX ADMIN — Medication 1 TABLET(S): at 13:06

## 2017-04-28 RX ADMIN — Medication 650 MILLIGRAM(S): at 13:07

## 2017-04-28 RX ADMIN — Medication 100 MILLIGRAM(S): at 21:37

## 2017-04-28 RX ADMIN — CELECOXIB 200 MILLIGRAM(S): 200 CAPSULE ORAL at 14:10

## 2017-04-28 RX ADMIN — Medication 325 MILLIGRAM(S): at 10:20

## 2017-04-28 RX ADMIN — CELECOXIB 200 MILLIGRAM(S): 200 CAPSULE ORAL at 22:07

## 2017-04-28 RX ADMIN — Medication 650 MILLIGRAM(S): at 06:14

## 2017-04-28 RX ADMIN — Medication 650 MILLIGRAM(S): at 21:37

## 2017-04-28 RX ADMIN — Medication 650 MILLIGRAM(S): at 07:00

## 2017-04-28 RX ADMIN — CELECOXIB 200 MILLIGRAM(S): 200 CAPSULE ORAL at 13:06

## 2017-04-28 RX ADMIN — CELECOXIB 200 MILLIGRAM(S): 200 CAPSULE ORAL at 21:36

## 2017-04-28 RX ADMIN — Medication 100 MILLIGRAM(S): at 13:06

## 2017-04-28 RX ADMIN — Medication 325 MILLIGRAM(S): at 21:37

## 2017-04-28 RX ADMIN — POLYETHYLENE GLYCOL 3350 17 GRAM(S): 17 POWDER, FOR SOLUTION ORAL at 13:06

## 2017-04-28 NOTE — PROGRESS NOTE ADULT - SUBJECTIVE AND OBJECTIVE BOX
SUBJECTIVE: Patient seen and examined. Pt doing well o/n.  No f/c/n/v/cp/sob.     OBJECTIVE:  Vital Signs Last 24 Hrs  T(C): 36.7, Max: 36.7 (04-28 @ 05:35)  T(F): 98, Max: 98 (04-28 @ 05:35)  HR: 86 (82 - 86)  BP: 123/79 (92/58 - 123/79)  BP(mean): --  RR: 14 (14 - 15)  SpO2: 96% (94% - 97%)    Affected extremity:          Dressing: clean/dry/intact            Sensation: SILT         Motor exam: 5/5 TA/GS/EHL         warm well perfused; capillary refill <3 seconds     LABS:                        9.4    7.8   )-----------( 140      ( 27 Apr 2017 07:30 )             27.9     04-27    137  |  104  |  12  ----------------------------<  101<H>  3.6   |  25  |  0.61    Ca    8.1<L>      27 Apr 2017 07:30          MEDICATIONS:lactated ringers. 1000milliLiter(s) IV Continuous <Continuous>  aspirin enteric coated 325milliGRAM(s) Oral two times a day  acetaminophen   Tablet 650milliGRAM(s) Oral every 6 hours PRN  acetaminophen   Tablet. 650milliGRAM(s) Oral every 8 hours  celecoxib 200milliGRAM(s) Oral two times a day after meals  oxyCODONE IR 5milliGRAM(s) Oral every 4 hours PRN  oxyCODONE IR 10milliGRAM(s) Oral every 4 hours PRN  morphine  - Injectable 2milliGRAM(s) IV Push every 3 hours PRN  aluminum hydroxide/magnesium hydroxide/simethicone Suspension 30milliLiter(s) Oral four times a day PRN  ondansetron Injectable 4milliGRAM(s) IV Push every 6 hours PRN  polyethylene glycol 3350 17Gram(s) Oral daily  bisacodyl Suppository 10milliGRAM(s) Rectal daily PRN  senna 2Tablet(s) Oral at bedtime PRN  magnesium hydroxide Suspension 30milliLiter(s) Oral daily PRN  docusate sodium 100milliGRAM(s) Oral three times a day  ondansetron Injectable 4milliGRAM(s) IV Push every 4 hours PRN  lactobacillus acidophilus 1Tablet(s) Oral daily    Anticoagulation:  aspirin enteric coated 325milliGRAM(s) Oral two times a day    Antibiotics:     Pain medications:   acetaminophen   Tablet 650milliGRAM(s) Oral every 6 hours PRN  acetaminophen   Tablet. 650milliGRAM(s) Oral every 8 hours  celecoxib 200milliGRAM(s) Oral two times a day after meals  oxyCODONE IR 5milliGRAM(s) Oral every 4 hours PRN  oxyCODONE IR 10milliGRAM(s) Oral every 4 hours PRN  morphine  - Injectable 2milliGRAM(s) IV Push every 3 hours PRN  ondansetron Injectable 4milliGRAM(s) IV Push every 6 hours PRN  ondansetron Injectable 4milliGRAM(s) IV Push every 4 hours PRN    A/P :  Pt is a 58yo Female s/p R BIJAN Ant Vega 4/25/17  POD # 3  -    Pain control  -    DVT ppx: ASA     -    Weight bearing status: WBAT   -    Physical Therapy  -    Dispo: Home

## 2017-04-29 VITALS
SYSTOLIC BLOOD PRESSURE: 99 MMHG | RESPIRATION RATE: 18 BRPM | DIASTOLIC BLOOD PRESSURE: 68 MMHG | OXYGEN SATURATION: 99 % | TEMPERATURE: 98 F | HEART RATE: 81 BPM

## 2017-04-29 LAB
ANION GAP SERPL CALC-SCNC: 9 MMOL/L — SIGNIFICANT CHANGE UP (ref 9–16)
BUN SERPL-MCNC: 12 MG/DL — SIGNIFICANT CHANGE UP (ref 7–23)
CALCIUM SERPL-MCNC: 8 MG/DL — LOW (ref 8.5–10.5)
CHLORIDE SERPL-SCNC: 106 MMOL/L — SIGNIFICANT CHANGE UP (ref 96–108)
CO2 SERPL-SCNC: 25 MMOL/L — SIGNIFICANT CHANGE UP (ref 22–31)
CREAT SERPL-MCNC: 0.64 MG/DL — SIGNIFICANT CHANGE UP (ref 0.5–1.3)
GLUCOSE SERPL-MCNC: 102 MG/DL — HIGH (ref 70–99)
HCT VFR BLD CALC: 27 % — LOW (ref 34.5–45)
HGB BLD-MCNC: 9.2 G/DL — LOW (ref 11.5–15.5)
MCHC RBC-ENTMCNC: 29.2 PG — SIGNIFICANT CHANGE UP (ref 27–34)
MCHC RBC-ENTMCNC: 34.1 G/DL — SIGNIFICANT CHANGE UP (ref 32–36)
MCV RBC AUTO: 85.7 FL — SIGNIFICANT CHANGE UP (ref 80–100)
PLATELET # BLD AUTO: 146 K/UL — LOW (ref 150–400)
POTASSIUM SERPL-MCNC: 3.9 MMOL/L — SIGNIFICANT CHANGE UP (ref 3.5–5.3)
POTASSIUM SERPL-SCNC: 3.9 MMOL/L — SIGNIFICANT CHANGE UP (ref 3.5–5.3)
RBC # BLD: 3.15 M/UL — LOW (ref 3.8–5.2)
RBC # FLD: 13 % — SIGNIFICANT CHANGE UP (ref 10.3–16.9)
SODIUM SERPL-SCNC: 140 MMOL/L — SIGNIFICANT CHANGE UP (ref 135–145)
WBC # BLD: 5.6 K/UL — SIGNIFICANT CHANGE UP (ref 3.8–10.5)
WBC # FLD AUTO: 5.6 K/UL — SIGNIFICANT CHANGE UP (ref 3.8–10.5)

## 2017-04-29 PROCEDURE — 86850 RBC ANTIBODY SCREEN: CPT

## 2017-04-29 PROCEDURE — C1776: CPT

## 2017-04-29 PROCEDURE — 97161 PT EVAL LOW COMPLEX 20 MIN: CPT

## 2017-04-29 PROCEDURE — 97535 SELF CARE MNGMENT TRAINING: CPT

## 2017-04-29 PROCEDURE — 87086 URINE CULTURE/COLONY COUNT: CPT

## 2017-04-29 PROCEDURE — 86900 BLOOD TYPING SEROLOGIC ABO: CPT

## 2017-04-29 PROCEDURE — 36415 COLL VENOUS BLD VENIPUNCTURE: CPT

## 2017-04-29 PROCEDURE — 81001 URINALYSIS AUTO W/SCOPE: CPT

## 2017-04-29 PROCEDURE — 76000 FLUOROSCOPY <1 HR PHYS/QHP: CPT

## 2017-04-29 PROCEDURE — 86901 BLOOD TYPING SEROLOGIC RH(D): CPT

## 2017-04-29 PROCEDURE — 72170 X-RAY EXAM OF PELVIS: CPT

## 2017-04-29 PROCEDURE — 80048 BASIC METABOLIC PNL TOTAL CA: CPT

## 2017-04-29 PROCEDURE — 85027 COMPLETE CBC AUTOMATED: CPT

## 2017-04-29 PROCEDURE — 97116 GAIT TRAINING THERAPY: CPT

## 2017-04-29 PROCEDURE — 88300 SURGICAL PATH GROSS: CPT

## 2017-04-29 RX ORDER — POTASSIUM CHLORIDE 20 MEQ
20 PACKET (EA) ORAL ONCE
Qty: 0 | Refills: 0 | Status: COMPLETED | OUTPATIENT
Start: 2017-04-29 | End: 2017-04-29

## 2017-04-29 RX ADMIN — Medication 650 MILLIGRAM(S): at 14:15

## 2017-04-29 RX ADMIN — Medication 325 MILLIGRAM(S): at 17:38

## 2017-04-29 RX ADMIN — Medication 650 MILLIGRAM(S): at 05:23

## 2017-04-29 RX ADMIN — CELECOXIB 200 MILLIGRAM(S): 200 CAPSULE ORAL at 09:53

## 2017-04-29 RX ADMIN — CELECOXIB 200 MILLIGRAM(S): 200 CAPSULE ORAL at 10:45

## 2017-04-29 RX ADMIN — Medication 1 TABLET(S): at 13:29

## 2017-04-29 RX ADMIN — Medication 100 MILLIGRAM(S): at 05:21

## 2017-04-29 RX ADMIN — Medication 325 MILLIGRAM(S): at 05:23

## 2017-04-29 RX ADMIN — Medication 20 MILLIEQUIVALENT(S): at 17:39

## 2017-04-29 RX ADMIN — Medication 650 MILLIGRAM(S): at 05:53

## 2017-04-29 RX ADMIN — Medication 650 MILLIGRAM(S): at 13:31

## 2017-04-29 NOTE — PROGRESS NOTE ADULT - SUBJECTIVE AND OBJECTIVE BOX
SUBJECTIVE: Patient seen and examined. Pt doing well o/n.  No f/c/n/v/cp/sob.       OBJECTIVE:  Vital Signs Last 24 Hrs  Vital Signs Last 24 Hrs  T(C): 36.3, Max: 36.7 ( @ 15:45)  T(F): 97.4, Max: 98.1 ( @ 15:45)  HR: 77 (73 - 88)  BP: 118/71 (92/62 - 118/71)  BP(mean): --  RR: 16 (14 - 16)  SpO2: 97% (95% - 99%)    Affected extremity:          Dressing: clean/dry/intact            Sensation: SILT         Motor exam: 5/5 TA/GS/EHL         warm well perfused; capillary refill <3 seconds     LABS:                           9.2    5.6   )-----------( 146      ( 2017 06:18 )             27.0         140  |  106  |  12  ----------------------------<  102<H>  3.9   |  25  |  0.64    Ca    8.0<L>      2017 06:18            Urinalysis Basic - ( 2017 11:22 )    Color: Yellow / Appearance: Clear / S.015 / pH: x  Gluc: x / Ketone: NEGATIVE  / Bili: NEGATIVE / Urobili: 0.2 E.U./dL   Blood: x / Protein: NEGATIVE mg/dL / Nitrite: NEGATIVE   Leuk Esterase: NEGATIVE / RBC: > 10 /HPF / WBC < 5 /HPF   Sq Epi: x / Non Sq Epi: Few /HPF / Bacteria: Present /HPF      MEDICATIONS:lactated ringers. 1000milliLiter(s) IV Continuous <Continuous>  aspirin enteric coated 325milliGRAM(s) Oral two times a day  acetaminophen   Tablet 650milliGRAM(s) Oral every 6 hours PRN  acetaminophen   Tablet. 650milliGRAM(s) Oral every 8 hours  celecoxib 200milliGRAM(s) Oral two times a day after meals  oxyCODONE IR 5milliGRAM(s) Oral every 4 hours PRN  oxyCODONE IR 10milliGRAM(s) Oral every 4 hours PRN  morphine  - Injectable 2milliGRAM(s) IV Push every 3 hours PRN  aluminum hydroxide/magnesium hydroxide/simethicone Suspension 30milliLiter(s) Oral four times a day PRN  ondansetron Injectable 4milliGRAM(s) IV Push every 6 hours PRN  polyethylene glycol 3350 17Gram(s) Oral daily  bisacodyl Suppository 10milliGRAM(s) Rectal daily PRN  senna 2Tablet(s) Oral at bedtime PRN  magnesium hydroxide Suspension 30milliLiter(s) Oral daily PRN  docusate sodium 100milliGRAM(s) Oral three times a day  ondansetron Injectable 4milliGRAM(s) IV Push every 4 hours PRN  lactobacillus acidophilus 1Tablet(s) Oral daily    Anticoagulation:  aspirin enteric coated 325milliGRAM(s) Oral two times a day    Antibiotics:     Pain medications:   acetaminophen   Tablet 650milliGRAM(s) Oral every 6 hours PRN  acetaminophen   Tablet. 650milliGRAM(s) Oral every 8 hours  celecoxib 200milliGRAM(s) Oral two times a day after meals  oxyCODONE IR 5milliGRAM(s) Oral every 4 hours PRN  oxyCODONE IR 10milliGRAM(s) Oral every 4 hours PRN  morphine  - Injectable 2milliGRAM(s) IV Push every 3 hours PRN  ondansetron Injectable 4milliGRAM(s) IV Push every 6 hours PRN  ondansetron Injectable 4milliGRAM(s) IV Push every 4 hours PRN    A/P :  Pt is a 56yo Female s/p R BIJAN Ant Vega 17 POD # 4  -    Pain control  -    DVT ppx: ASA     -    Weight bearing status: WBAT   -    Physical Therapy  -    Dispo: Home

## 2017-05-05 DIAGNOSIS — K21.9 GASTRO-ESOPHAGEAL REFLUX DISEASE WITHOUT ESOPHAGITIS: ICD-10-CM

## 2017-05-05 DIAGNOSIS — K75.9 INFLAMMATORY LIVER DISEASE, UNSPECIFIED: ICD-10-CM

## 2017-05-05 DIAGNOSIS — Z80.7 FAMILY HISTORY OF OTHER MALIGNANT NEOPLASMS OF LYMPHOID, HEMATOPOIETIC AND RELATED TISSUES: ICD-10-CM

## 2017-05-05 DIAGNOSIS — M16.11 UNILATERAL PRIMARY OSTEOARTHRITIS, RIGHT HIP: ICD-10-CM

## 2017-05-14 ENCOUNTER — FORM ENCOUNTER (OUTPATIENT)
Age: 57
End: 2017-05-14

## 2017-05-15 ENCOUNTER — OUTPATIENT (OUTPATIENT)
Dept: OUTPATIENT SERVICES | Facility: HOSPITAL | Age: 57
LOS: 1 days | End: 2017-05-15
Payer: COMMERCIAL

## 2017-05-15 ENCOUNTER — APPOINTMENT (OUTPATIENT)
Dept: ORTHOPEDIC SURGERY | Facility: CLINIC | Age: 57
End: 2017-05-15

## 2017-05-15 PROCEDURE — 73502 X-RAY EXAM HIP UNI 2-3 VIEWS: CPT

## 2017-05-15 PROCEDURE — 73502 X-RAY EXAM HIP UNI 2-3 VIEWS: CPT | Mod: 26,RT

## 2017-05-15 RX ORDER — OXYCODONE AND ACETAMINOPHEN 5; 325 MG/1; MG/1
5-325 TABLET ORAL
Qty: 70 | Refills: 0 | Status: DISCONTINUED | COMMUNITY
Start: 2017-04-18 | End: 2017-05-15

## 2017-06-13 ENCOUNTER — APPOINTMENT (OUTPATIENT)
Dept: ORTHOPEDIC SURGERY | Facility: CLINIC | Age: 57
End: 2017-06-13

## 2017-06-13 DIAGNOSIS — Z96.641 AFTERCARE FOLLOWING JOINT REPLACEMENT SURGERY: ICD-10-CM

## 2017-06-13 DIAGNOSIS — M16.11 UNILATERAL PRIMARY OSTEOARTHRITIS, RIGHT HIP: ICD-10-CM

## 2017-06-13 DIAGNOSIS — Z47.1 AFTERCARE FOLLOWING JOINT REPLACEMENT SURGERY: ICD-10-CM

## 2017-06-13 RX ORDER — ASPIRIN/ACETAMINOPHEN/CAFFEINE 500-325-65
325 POWDER IN PACKET (EA) ORAL
Qty: 60 | Refills: 0 | Status: DISCONTINUED | COMMUNITY
Start: 2017-04-18 | End: 2017-06-13

## 2017-06-13 RX ORDER — MELOXICAM 15 MG/1
15 TABLET ORAL
Qty: 42 | Refills: 0 | Status: DISCONTINUED | COMMUNITY
Start: 2017-04-19 | End: 2017-06-13

## 2017-06-13 RX ORDER — CELECOXIB 200 MG/1
200 CAPSULE ORAL TWICE DAILY
Qty: 84 | Refills: 0 | Status: DISCONTINUED | COMMUNITY
Start: 2017-04-18 | End: 2017-06-13

## 2019-11-22 ENCOUNTER — RESULT REVIEW (OUTPATIENT)
Age: 59
End: 2019-11-22

## 2019-12-10 NOTE — OCCUPATIONAL THERAPY INITIAL EVALUATION ADULT - TRANSFER SAFETY CONCERNS NOTED: SIT/STAND, REHAB EVAL
The pt was called and informed of her lab result and sent Rx by personalized V.[facility]  
decreased weight-shifting ability

## 2020-04-07 NOTE — PATIENT PROFILE ADULT. - PAIN CHRONIC, PROFILE
Medication:   Requested Prescriptions     Pending Prescriptions Disp Refills    levothyroxine (SYNTHROID) 100 MCG tablet [Pharmacy Med Name: LEVOTHYROXINE 100 MCG TABLET] 30 tablet 2     Sig: TAKE ONE TABLET BY MOUTH EVERY DAY       Last Filled:      Patient Phone Number: [phone] (home)     Last appt: 12/4/2019   Next appt: Visit date not found    Last Thyroid:   Lab Results   Component Value Date    TSH 3.19 12/04/2019    T4FREE 1.05 02/16/2012       Last OARRS: No flowsheet data found.     Preferred Pharmacy:   [facility] Charlee Hull [phone] - F [phone]  [address] [address]  Phone: [phone] Fax: [phone]    [facility] [address] 9 - P [phone] Pily Bowman [phone]  [address] [place]  Phone: [phone] Fax: [phone] no

## 2022-06-02 ENCOUNTER — NON-APPOINTMENT (OUTPATIENT)
Age: 62
End: 2022-06-02

## 2023-02-06 ENCOUNTER — NON-APPOINTMENT (OUTPATIENT)
Age: 63
End: 2023-02-06

## 2023-02-06 ENCOUNTER — APPOINTMENT (OUTPATIENT)
Dept: OPHTHALMOLOGY | Facility: CLINIC | Age: 63
End: 2023-02-06
Payer: MEDICAID

## 2023-02-06 PROCEDURE — 92004 COMPRE OPH EXAM NEW PT 1/>: CPT

## 2023-02-06 PROCEDURE — 92020 GONIOSCOPY: CPT

## 2023-02-14 NOTE — OCCUPATIONAL THERAPY INITIAL EVALUATION ADULT - ASSISTIVE DEVICE FOR TOILET TRANSFER, REHAB EVAL
Call placed to pt and informed her per  I ordered her Azithromycin to start today commode/rolling walker

## 2023-02-21 ENCOUNTER — APPOINTMENT (OUTPATIENT)
Dept: OPHTHALMOLOGY | Facility: CLINIC | Age: 63
End: 2023-02-21

## 2023-03-28 ENCOUNTER — NON-APPOINTMENT (OUTPATIENT)
Age: 63
End: 2023-03-28

## 2023-03-28 ENCOUNTER — APPOINTMENT (OUTPATIENT)
Dept: OPHTHALMOLOGY | Facility: CLINIC | Age: 63
End: 2023-03-28
Payer: MEDICAID

## 2023-03-28 PROCEDURE — 92012 INTRM OPH EXAM EST PATIENT: CPT

## 2023-03-28 PROCEDURE — 92015 DETERMINE REFRACTIVE STATE: CPT | Mod: NC

## 2023-03-28 PROCEDURE — 92133 CPTRZD OPH DX IMG PST SGM ON: CPT

## 2023-03-28 PROCEDURE — 92083 EXTENDED VISUAL FIELD XM: CPT

## 2023-09-26 ENCOUNTER — APPOINTMENT (OUTPATIENT)
Dept: OPHTHALMOLOGY | Facility: CLINIC | Age: 63
End: 2023-09-26